# Patient Record
Sex: FEMALE | Race: WHITE | NOT HISPANIC OR LATINO | Employment: UNEMPLOYED | URBAN - NONMETROPOLITAN AREA
[De-identification: names, ages, dates, MRNs, and addresses within clinical notes are randomized per-mention and may not be internally consistent; named-entity substitution may affect disease eponyms.]

---

## 2017-02-13 LAB
EXTERNAL ABO GROUPING: NORMAL
EXTERNAL ANTIBODY SCREEN: NEGATIVE
EXTERNAL GLUCOSE TOLERANCE TEST FASTING: 87 MG/DL
EXTERNAL HEPATITIS B SURFACE ANTIGEN: NEGATIVE
EXTERNAL HEPATITIS C AB: NORMAL
EXTERNAL RH FACTOR: POSITIVE
EXTERNAL SYPHILIS RPR SCREEN: NORMAL
HIV1 AB SPEC QL IA.RAPID: NEGATIVE

## 2017-03-07 LAB
EXTERNAL HEMATOCRIT: 39 %
EXTERNAL HEMOGLOBIN: 13.3 G/DL

## 2017-08-27 ENCOUNTER — HOSPITAL ENCOUNTER (OUTPATIENT)
Facility: HOSPITAL | Age: 22
Setting detail: OBSERVATION
Discharge: HOME | End: 2017-08-28
Attending: OBSTETRICS & GYNECOLOGY | Admitting: OBSTETRICS & GYNECOLOGY

## 2017-08-27 PROBLEM — Z34.90 PREGNANT: Status: ACTIVE | Noted: 2017-08-27

## 2017-08-27 LAB
BASOPHILS # BLD AUTO: 0.01 10*3/MM3 (ref 0–0.3)
BASOPHILS NFR BLD AUTO: 0.1 % (ref 0–2)
BILIRUB UR QL STRIP: NEGATIVE
CLARITY UR: CLEAR
COLOR UR: YELLOW
DEPRECATED RDW RBC AUTO: 40.5 FL (ref 37–54)
EOSINOPHIL # BLD AUTO: 0.03 10*3/MM3 (ref 0–0.7)
EOSINOPHIL NFR BLD AUTO: 0.4 % (ref 0–5)
ERYTHROCYTE [DISTWIDTH] IN BLOOD BY AUTOMATED COUNT: 12.5 % (ref 11.5–14.5)
GLUCOSE UR STRIP-MCNC: NEGATIVE MG/DL
HCT VFR BLD AUTO: 35.4 % (ref 37–47)
HGB BLD-MCNC: 12 G/DL (ref 12–16)
HGB UR QL STRIP.AUTO: NEGATIVE
IMM GRANULOCYTES # BLD: 0.02 10*3/MM3 (ref 0–0.03)
IMM GRANULOCYTES NFR BLD: 0.3 % (ref 0–0.5)
KETONES UR QL STRIP: NEGATIVE
LEUKOCYTE ESTERASE UR QL STRIP.AUTO: NEGATIVE
LYMPHOCYTES # BLD AUTO: 2.01 10*3/MM3 (ref 1–3)
LYMPHOCYTES NFR BLD AUTO: 27.3 % (ref 21–51)
MCH RBC QN AUTO: 30.9 PG (ref 27–33)
MCHC RBC AUTO-ENTMCNC: 33.9 G/DL (ref 33–37)
MCV RBC AUTO: 91.2 FL (ref 80–94)
MONOCYTES # BLD AUTO: 0.77 10*3/MM3 (ref 0.1–0.9)
MONOCYTES NFR BLD AUTO: 10.5 % (ref 0–10)
NEUTROPHILS # BLD AUTO: 4.51 10*3/MM3 (ref 1.4–6.5)
NEUTROPHILS NFR BLD AUTO: 61.4 % (ref 30–70)
NITRITE UR QL STRIP: NEGATIVE
PH UR STRIP.AUTO: 6.5 [PH] (ref 5–8)
PLATELET # BLD AUTO: 203 10*3/MM3 (ref 130–400)
PMV BLD AUTO: 9.9 FL (ref 6–10)
PROT UR QL STRIP: NEGATIVE
RBC # BLD AUTO: 3.88 10*6/MM3 (ref 4.2–5.4)
SP GR UR STRIP: 1.01 (ref 1–1.03)
UROBILINOGEN UR QL STRIP: NORMAL
WBC NRBC COR # BLD: 7.35 10*3/MM3 (ref 4.5–12.5)

## 2017-08-27 PROCEDURE — 96360 HYDRATION IV INFUSION INIT: CPT

## 2017-08-27 PROCEDURE — 81003 URINALYSIS AUTO W/O SCOPE: CPT | Performed by: OBSTETRICS & GYNECOLOGY

## 2017-08-27 PROCEDURE — 59025 FETAL NON-STRESS TEST: CPT

## 2017-08-27 PROCEDURE — P9612 CATHETERIZE FOR URINE SPEC: HCPCS

## 2017-08-27 PROCEDURE — G0378 HOSPITAL OBSERVATION PER HR: HCPCS

## 2017-08-27 PROCEDURE — G0463 HOSPITAL OUTPT CLINIC VISIT: HCPCS

## 2017-08-27 PROCEDURE — 85025 COMPLETE CBC W/AUTO DIFF WBC: CPT | Performed by: OBSTETRICS & GYNECOLOGY

## 2017-08-27 RX ORDER — SODIUM CHLORIDE, SODIUM LACTATE, POTASSIUM CHLORIDE, CALCIUM CHLORIDE 600; 310; 30; 20 MG/100ML; MG/100ML; MG/100ML; MG/100ML
INJECTION, SOLUTION INTRAVENOUS
Status: COMPLETED
Start: 2017-08-27 | End: 2017-08-27

## 2017-08-27 RX ORDER — NIFEDIPINE 10 MG/1
10 CAPSULE ORAL
Status: DISCONTINUED | OUTPATIENT
Start: 2017-08-27 | End: 2017-08-28 | Stop reason: HOSPADM

## 2017-08-27 RX ORDER — SODIUM CHLORIDE, SODIUM LACTATE, POTASSIUM CHLORIDE, CALCIUM CHLORIDE 600; 310; 30; 20 MG/100ML; MG/100ML; MG/100ML; MG/100ML
150 INJECTION, SOLUTION INTRAVENOUS CONTINUOUS
Status: DISCONTINUED | OUTPATIENT
Start: 2017-08-27 | End: 2017-08-28 | Stop reason: HOSPADM

## 2017-08-27 RX ORDER — PRENATAL VIT/IRON FUM/FOLIC AC 27MG-0.8MG
1 TABLET ORAL DAILY
COMMUNITY

## 2017-08-27 RX ADMIN — NIFEDIPINE 10 MG: 10 CAPSULE ORAL at 21:38

## 2017-08-27 RX ADMIN — SODIUM CHLORIDE, SODIUM LACTATE, POTASSIUM CHLORIDE, AND CALCIUM CHLORIDE 150 ML/HR: .6; .31; .03; .02 INJECTION, SOLUTION INTRAVENOUS at 22:50

## 2017-08-28 VITALS
RESPIRATION RATE: 18 BRPM | WEIGHT: 154 LBS | OXYGEN SATURATION: 99 % | DIASTOLIC BLOOD PRESSURE: 44 MMHG | TEMPERATURE: 98.2 F | BODY MASS INDEX: 25.66 KG/M2 | HEIGHT: 65 IN | HEART RATE: 75 BPM | SYSTOLIC BLOOD PRESSURE: 91 MMHG

## 2017-08-28 PROCEDURE — G0378 HOSPITAL OBSERVATION PER HR: HCPCS

## 2017-08-28 PROCEDURE — P9612 CATHETERIZE FOR URINE SPEC: HCPCS

## 2017-08-28 PROCEDURE — 59025 FETAL NON-STRESS TEST: CPT

## 2017-08-28 PROCEDURE — G0463 HOSPITAL OUTPT CLINIC VISIT: HCPCS

## 2017-08-28 PROCEDURE — 96361 HYDRATE IV INFUSION ADD-ON: CPT

## 2017-08-28 RX ORDER — HYDROXYZINE 50 MG/1
50 TABLET, FILM COATED ORAL NIGHTLY PRN
Status: DISCONTINUED | OUTPATIENT
Start: 2017-08-28 | End: 2017-08-28 | Stop reason: HOSPADM

## 2017-08-28 RX ADMIN — HYDROXYZINE HYDROCHLORIDE 50 MG: 50 TABLET ORAL at 01:02

## 2017-08-28 RX ADMIN — SODIUM CHLORIDE, SODIUM LACTATE, POTASSIUM CHLORIDE, AND CALCIUM CHLORIDE 150 ML/HR: .6; .31; .03; .02 INJECTION, SOLUTION INTRAVENOUS at 07:45

## 2017-08-28 RX ADMIN — NIFEDIPINE 10 MG: 10 CAPSULE ORAL at 02:00

## 2017-08-28 RX ADMIN — SODIUM CHLORIDE, SODIUM LACTATE, POTASSIUM CHLORIDE, AND CALCIUM CHLORIDE 150 ML/HR: .6; .31; .03; .02 INJECTION, SOLUTION INTRAVENOUS at 01:03

## 2020-01-26 ENCOUNTER — HOSPITAL ENCOUNTER (EMERGENCY)
Facility: HOSPITAL | Age: 25
Discharge: LEFT BEFORE DISCHARGE | End: 2020-01-26
Attending: EMERGENCY MEDICINE

## 2020-01-26 ENCOUNTER — HOSPITAL ENCOUNTER (EMERGENCY)
Facility: HOSPITAL | Age: 25
Discharge: HOME | End: 2020-01-26
Attending: EMERGENCY MEDICINE | Admitting: EMERGENCY MEDICINE

## 2020-01-26 VITALS
RESPIRATION RATE: 16 BRPM | SYSTOLIC BLOOD PRESSURE: 113 MMHG | WEIGHT: 140 LBS | OXYGEN SATURATION: 100 % | HEIGHT: 66 IN | DIASTOLIC BLOOD PRESSURE: 69 MMHG | TEMPERATURE: 101.9 F | HEART RATE: 97 BPM | BODY MASS INDEX: 22.5 KG/M2

## 2020-01-26 VITALS
SYSTOLIC BLOOD PRESSURE: 122 MMHG | TEMPERATURE: 99 F | BODY MASS INDEX: 22.5 KG/M2 | OXYGEN SATURATION: 98 % | WEIGHT: 140 LBS | DIASTOLIC BLOOD PRESSURE: 64 MMHG | HEIGHT: 66 IN | RESPIRATION RATE: 20 BRPM | HEART RATE: 104 BPM

## 2020-01-26 DIAGNOSIS — J10.1 INFLUENZA B: Primary | ICD-10-CM

## 2020-01-26 LAB
FLUAV AG NPH QL: NEGATIVE
FLUBV AG NPH QL IA: POSITIVE
S PYO AG THROAT QL: NEGATIVE

## 2020-01-26 PROCEDURE — 99211 OFF/OP EST MAY X REQ PHY/QHP: CPT

## 2020-01-26 PROCEDURE — 87081 CULTURE SCREEN ONLY: CPT | Performed by: NURSE PRACTITIONER

## 2020-01-26 PROCEDURE — 87880 STREP A ASSAY W/OPTIC: CPT | Performed by: NURSE PRACTITIONER

## 2020-01-26 PROCEDURE — 87804 INFLUENZA ASSAY W/OPTIC: CPT | Performed by: NURSE PRACTITIONER

## 2020-01-26 PROCEDURE — 99283 EMERGENCY DEPT VISIT LOW MDM: CPT

## 2020-01-26 RX ORDER — ONDANSETRON 4 MG/1
4 TABLET, ORALLY DISINTEGRATING ORAL EVERY 6 HOURS PRN
Qty: 15 TABLET | Refills: 0 | Status: SHIPPED | OUTPATIENT
Start: 2020-01-26

## 2020-01-26 RX ORDER — IBUPROFEN 800 MG/1
800 TABLET ORAL EVERY 6 HOURS PRN
Qty: 25 TABLET | Refills: 0 | Status: SHIPPED | OUTPATIENT
Start: 2020-01-26

## 2020-01-26 RX ORDER — DEXTROMETHORPHAN HYDROBROMIDE AND PROMETHAZINE HYDROCHLORIDE 15; 6.25 MG/5ML; MG/5ML
5 SYRUP ORAL 4 TIMES DAILY PRN
Qty: 118 ML | Refills: 0 | Status: SHIPPED | OUTPATIENT
Start: 2020-01-26

## 2020-01-26 RX ORDER — OSELTAMIVIR PHOSPHATE 75 MG/1
75 CAPSULE ORAL 2 TIMES DAILY
Qty: 10 CAPSULE | Refills: 0 | Status: SHIPPED | OUTPATIENT
Start: 2020-01-26 | End: 2020-01-31

## 2020-01-26 RX ORDER — ACETAMINOPHEN 500 MG
1000 TABLET ORAL ONCE
Status: COMPLETED | OUTPATIENT
Start: 2020-01-26 | End: 2020-01-26

## 2020-01-26 RX ADMIN — ACETAMINOPHEN 1000 MG: 500 TABLET ORAL at 15:39

## 2020-01-26 NOTE — DISCHARGE INSTRUCTIONS
Call one of the offices below to establish a primary care provider.  If you are unable to get an appointment and feel it is an emergency and need to be seen immediately please return to the Emergency Department.    Call one of the office below to set up a primary care provider.    Dr. Chris Hoover                                                                                                       [address] [facility] [address]  [phone]    Dr. Martin, Dr. EDGARD Fragoso, Dr. SHANE Fragoso ([facility])  [address] [facility] [address]  [phone]    Dr. Reed, Dr. Gray, Dr. Pitts ([facility])  [address] [facility] [address]  [phone]    Dr. Yu  [address] [facility] [address]  [phone]    Dr. Henderson, Dr. Zapata, Dr. Saunders, Dr. Stephenson ([facility])  [address] [place] [address] [address]  [facility] [address]  [phone]    Dr. Meeta Briones  [address] [facility] [place] [address]  [phone]    Dr. Karyn Cortez  [address]   [address]  [facility] [address]  [phone]    Dr. Hoover  [address] [facility] [address]  [phone]    Dr. Coats, Dr. Angulo  [address] [facility] [place] [address]  [phone]    Dr. Franz  [address]                                                              [address]  [facility] [address]  [phone]    Dr. Khoury  [address] [facility] [id number] [phone]    Dr. Estefani Corrales  [address]  [address]  [place] [address]  [phone]

## 2020-01-27 NOTE — ED PROVIDER NOTES
Subjective     Influenza   Presenting symptoms: cough, fever, headache, myalgias, rhinorrhea, sore throat and vomiting    Severity:  Moderate  Onset quality:  Sudden  Duration:  1 day  Progression:  Worsening  Chronicity:  New  Relieved by:  None tried  Worsened by:  Nothing  Ineffective treatments:  None tried  Associated symptoms: chills and decreased appetite        Review of Systems   Constitutional: Positive for chills, decreased appetite and fever.   HENT: Positive for rhinorrhea and sore throat.    Respiratory: Positive for cough.    Cardiovascular: Negative.  Negative for chest pain.   Gastrointestinal: Positive for vomiting. Negative for abdominal pain.   Endocrine: Negative.    Genitourinary: Negative.  Negative for dysuria.   Musculoskeletal: Positive for myalgias.   Skin: Negative.    Neurological: Positive for headaches.   Psychiatric/Behavioral: Negative.    All other systems reviewed and are negative.      Past Medical History:   Diagnosis Date   • Anxiety     took prozac for about 6months before last pregnancy.    • Cyst of vagina     noted last OB visit   • Varicella     as a child       No Known Allergies    No past surgical history on file.    Family History   Problem Relation Age of Onset   • Hypertension Mother    • Cancer Paternal Grandfather    • Hypertension Paternal Grandmother    • Hypertension Maternal Grandmother    • Coronary artery disease Maternal Grandmother    • Coronary artery disease Maternal Grandfather        Social History     Socioeconomic History   • Marital status:      Spouse name: Not on file   • Number of children: Not on file   • Years of education: Not on file   • Highest education level: Not on file   Tobacco Use   • Smoking status: Never Smoker   Substance and Sexual Activity   • Alcohol use: No   • Drug use: No   • Sexual activity: Yes     Partners: Male           Objective   Physical Exam   Constitutional: She is oriented to person, place, and time. She  appears well-developed and well-nourished. She has a sickly appearance. No distress.   HENT:   Head: Normocephalic and atraumatic.   Right Ear: External ear normal.   Left Ear: External ear normal.   Nose: Nose normal.   Mouth/Throat: Posterior oropharyngeal erythema present.   Eyes: Pupils are equal, round, and reactive to light. Conjunctivae and EOM are normal.   Neck: Normal range of motion. Neck supple. No JVD present. No tracheal deviation present.   Cardiovascular: Normal rate, regular rhythm and normal heart sounds.   No murmur heard.  Pulmonary/Chest: Effort normal and breath sounds normal. No respiratory distress. She has no wheezes.   Abdominal: Soft. Bowel sounds are normal. There is no tenderness.   Musculoskeletal: Normal range of motion. She exhibits no edema or deformity.   Neurological: She is alert and oriented to person, place, and time. No cranial nerve deficit.   Skin: Skin is warm and dry. No rash noted. She is not diaphoretic. No erythema. No pallor.   Psychiatric: She has a normal mood and affect. Her behavior is normal. Thought content normal.   Nursing note and vitals reviewed.      Procedures           ED Course                                               MDM  Number of Diagnoses or Management Options  Influenza B: minor     Amount and/or Complexity of Data Reviewed  Clinical lab tests: reviewed    Risk of Complications, Morbidity, and/or Mortality  Presenting problems: low  Diagnostic procedures: low  Management options: low        Final diagnoses:   Influenza B            Karoline Andrew, APRN  01/26/20 2120

## 2020-01-28 LAB — BACTERIA SPEC AEROBE CULT: NORMAL

## 2022-02-15 ENCOUNTER — HOSPITAL ENCOUNTER (OUTPATIENT)
Dept: GENERAL RADIOLOGY | Facility: HOSPITAL | Age: 27
Discharge: HOME | End: 2022-02-15
Admitting: NURSE PRACTITIONER

## 2022-02-15 ENCOUNTER — TRANSCRIBE ORDERS (OUTPATIENT)
Dept: ADMINISTRATIVE | Facility: HOSPITAL | Age: 27
End: 2022-02-15

## 2022-02-15 DIAGNOSIS — M54.50 LOW BACK PAIN, UNSPECIFIED BACK PAIN LATERALITY, UNSPECIFIED CHRONICITY, UNSPECIFIED WHETHER SCIATICA PRESENT: ICD-10-CM

## 2022-02-15 DIAGNOSIS — M54.50 LOW BACK PAIN, UNSPECIFIED BACK PAIN LATERALITY, UNSPECIFIED CHRONICITY, UNSPECIFIED WHETHER SCIATICA PRESENT: Primary | ICD-10-CM

## 2022-02-15 PROCEDURE — 72110 X-RAY EXAM L-2 SPINE 4/>VWS: CPT

## 2022-02-15 PROCEDURE — 72110 X-RAY EXAM L-2 SPINE 4/>VWS: CPT | Performed by: RADIOLOGY
